# Patient Record
Sex: FEMALE | Race: WHITE | Employment: UNEMPLOYED | URBAN - METROPOLITAN AREA
[De-identification: names, ages, dates, MRNs, and addresses within clinical notes are randomized per-mention and may not be internally consistent; named-entity substitution may affect disease eponyms.]

---

## 2021-01-27 ENCOUNTER — HOSPITAL ENCOUNTER (OUTPATIENT)
Age: 31
Discharge: HOME | End: 2021-01-27
Payer: MEDICAID

## 2021-01-27 VITALS
RESPIRATION RATE: 18 BRPM | DIASTOLIC BLOOD PRESSURE: 49 MMHG | HEART RATE: 77 BPM | OXYGEN SATURATION: 100 % | SYSTOLIC BLOOD PRESSURE: 116 MMHG | TEMPERATURE: 98 F

## 2021-01-27 DIAGNOSIS — Z20.822 ENCOUNTER FOR LABORATORY TESTING FOR COVID-19 VIRUS: Primary | ICD-10-CM

## 2021-01-27 LAB — SARS-COV-2 RNA RESP QL NAA+PROBE: NOT DETECTED

## 2021-01-27 PROCEDURE — 99202 OFFICE O/P NEW SF 15 MIN: CPT

## 2021-01-27 PROCEDURE — 99212 OFFICE O/P EST SF 10 MIN: CPT

## 2021-01-28 NOTE — ED PROVIDER NOTES
Patient Seen in: [facility] [place]      History   Patient presents with:  Testing    Stated Complaint: COVID test,no exposure, symptoms fever loss of taste    HPI/Subjective:   HPI    21-year-old female here for Covid testing.   Patient reports subje General: Abdomen is flat. Musculoskeletal: Normal range of motion. Skin:     General: Skin is warm. Neurological:      General: No focal deficit present. Mental Status: She is alert and oriented to person, place, and time.    Psychiatric:

## 2021-01-28 NOTE — ED INITIAL ASSESSMENT (HPI)
Here for testing, denies sick contact with covid, states loss of sense of smell and taste for 2 days, pt is 24 weeks pregnant with prenatal care

## 2023-11-16 ENCOUNTER — APPOINTMENT (OUTPATIENT)
Dept: CT IMAGING | Age: 33
End: 2023-11-16
Attending: EMERGENCY MEDICINE

## 2023-11-16 ENCOUNTER — HOSPITAL ENCOUNTER (EMERGENCY)
Age: 33
Discharge: HOME | End: 2023-11-16
Attending: EMERGENCY MEDICINE

## 2023-11-16 VITALS
HEART RATE: 74 BPM | RESPIRATION RATE: 18 BRPM | BODY MASS INDEX: 21.45 KG/M2 | SYSTOLIC BLOOD PRESSURE: 112 MMHG | TEMPERATURE: 97.6 F | OXYGEN SATURATION: 99 % | WEIGHT: 125.66 LBS | HEIGHT: 64 IN | DIASTOLIC BLOOD PRESSURE: 64 MMHG

## 2023-11-16 DIAGNOSIS — M51.26 LUMBAR HERNIATED DISC: ICD-10-CM

## 2023-11-16 DIAGNOSIS — M54.42 ACUTE LEFT-SIDED LOW BACK PAIN WITH LEFT-SIDED SCIATICA: Primary | ICD-10-CM

## 2023-11-16 LAB
ANION GAP SERPL CALC-SCNC: 8 MMOL/L (ref 7–19)
BASOPHILS # BLD: 0 K/MCL (ref 0–0.3)
BASOPHILS NFR BLD: 0 %
BUN SERPL-MCNC: 16 MG/DL (ref 6–20)
BUN/CREAT SERPL: 23 (ref 7–25)
CALCIUM SERPL-MCNC: 9 MG/DL (ref 8.4–10.2)
CHLORIDE SERPL-SCNC: 109 MMOL/L (ref 97–110)
CO2 SERPL-SCNC: 25 MMOL/L (ref 21–32)
CREAT SERPL-MCNC: 0.7 MG/DL (ref 0.51–0.95)
DEPRECATED RDW RBC: 45.7 FL (ref 39–50)
EGFRCR SERPLBLD CKD-EPI 2021: >90 ML/MIN/{1.73_M2}
EOSINOPHIL # BLD: 0.1 K/MCL (ref 0–0.5)
EOSINOPHIL NFR BLD: 2 %
ERYTHROCYTE [DISTWIDTH] IN BLOOD: 12.8 % (ref 11–15)
FASTING DURATION TIME PATIENT: NORMAL H
GLUCOSE SERPL-MCNC: 96 MG/DL (ref 70–99)
HCG SERPL QL: NEGATIVE
HCT VFR BLD CALC: 41.7 % (ref 36–46.5)
HGB BLD-MCNC: 13.8 G/DL (ref 12–15.5)
IMM GRANULOCYTES # BLD AUTO: 0 K/MCL (ref 0–0.2)
IMM GRANULOCYTES # BLD: 0 %
LYMPHOCYTES # BLD: 1.9 K/MCL (ref 1–4.8)
LYMPHOCYTES NFR BLD: 32 %
MCH RBC QN AUTO: 31.9 PG (ref 26–34)
MCHC RBC AUTO-ENTMCNC: 33.1 G/DL (ref 32–36.5)
MCV RBC AUTO: 96.5 FL (ref 78–100)
MONOCYTES # BLD: 0.4 K/MCL (ref 0.3–0.9)
MONOCYTES NFR BLD: 7 %
NEUTROPHILS # BLD: 3.4 K/MCL (ref 1.8–7.7)
NEUTROPHILS NFR BLD: 59 %
NRBC BLD MANUAL-RTO: 0 /100 WBC
PLATELET # BLD AUTO: 223 K/MCL (ref 140–450)
POTASSIUM SERPL-SCNC: 4.1 MMOL/L (ref 3.4–5.1)
RAINBOW EXTRA TUBES HOLD SPECIMEN: NORMAL
RBC # BLD: 4.32 MIL/MCL (ref 4–5.2)
SODIUM SERPL-SCNC: 138 MMOL/L (ref 135–145)
WBC # BLD: 5.9 K/MCL (ref 4.2–11)

## 2023-11-16 PROCEDURE — 96374 THER/PROPH/DIAG INJ IV PUSH: CPT

## 2023-11-16 PROCEDURE — 74174 CTA ABD&PLVS W/CONTRAST: CPT

## 2023-11-16 PROCEDURE — 85025 COMPLETE CBC W/AUTO DIFF WBC: CPT | Performed by: EMERGENCY MEDICINE

## 2023-11-16 PROCEDURE — 96375 TX/PRO/DX INJ NEW DRUG ADDON: CPT

## 2023-11-16 PROCEDURE — 10005281 CT LUMBAR SPINE 2D REFORMATTED

## 2023-11-16 PROCEDURE — 10002805 HB CONTRAST AGENT: Performed by: EMERGENCY MEDICINE

## 2023-11-16 PROCEDURE — 99283 EMERGENCY DEPT VISIT LOW MDM: CPT

## 2023-11-16 PROCEDURE — 10002800 HB RX 250 W HCPCS: Performed by: EMERGENCY MEDICINE

## 2023-11-16 PROCEDURE — 84703 CHORIONIC GONADOTROPIN ASSAY: CPT | Performed by: EMERGENCY MEDICINE

## 2023-11-16 PROCEDURE — 80048 BASIC METABOLIC PNL TOTAL CA: CPT | Performed by: EMERGENCY MEDICINE

## 2023-11-16 RX ORDER — KETOROLAC TROMETHAMINE 15 MG/ML
15 INJECTION, SOLUTION INTRAMUSCULAR; INTRAVENOUS ONCE
Status: COMPLETED | OUTPATIENT
Start: 2023-11-16 | End: 2023-11-16

## 2023-11-16 RX ORDER — HYDROCODONE BITARTRATE AND ACETAMINOPHEN 5; 325 MG/1; MG/1
1-2 TABLET ORAL EVERY 6 HOURS PRN
Qty: 12 TABLET | Refills: 0 | Status: SHIPPED | OUTPATIENT
Start: 2023-11-16 | End: 2023-11-19

## 2023-11-16 RX ORDER — ONDANSETRON 2 MG/ML
4 INJECTION INTRAMUSCULAR; INTRAVENOUS ONCE
Status: COMPLETED | OUTPATIENT
Start: 2023-11-16 | End: 2023-11-16

## 2023-11-16 RX ORDER — IBUPROFEN 600 MG/1
600 TABLET ORAL EVERY 6 HOURS
Qty: 40 TABLET | Refills: 0 | Status: SHIPPED | OUTPATIENT
Start: 2023-11-16 | End: 2023-11-26

## 2023-11-16 RX ORDER — METHYLPREDNISOLONE 4 MG/1
4 TABLET ORAL SEE ADMIN INSTRUCTIONS
Qty: 21 TABLET | Refills: 0 | Status: SHIPPED | OUTPATIENT
Start: 2023-11-16

## 2023-11-16 RX ADMIN — IOHEXOL 80 ML: 350 INJECTION, SOLUTION INTRAVENOUS at 11:56

## 2023-11-16 RX ADMIN — ONDANSETRON 4 MG: 2 INJECTION INTRAMUSCULAR; INTRAVENOUS at 11:05

## 2023-11-16 RX ADMIN — MORPHINE SULFATE 4 MG: 4 INJECTION INTRAVENOUS at 11:07

## 2023-11-16 RX ADMIN — KETOROLAC TROMETHAMINE 15 MG: 15 INJECTION, SOLUTION INTRAMUSCULAR; INTRAVENOUS at 12:34

## 2023-11-16 ASSESSMENT — ENCOUNTER SYMPTOMS
ABDOMINAL PAIN: 0
FEVER: 0
BACK PAIN: 1
VOMITING: 0
DIARRHEA: 0
NAUSEA: 0
SHORTNESS OF BREATH: 0
DIZZINESS: 0
CHILLS: 0
LIGHT-HEADEDNESS: 0

## 2023-11-16 ASSESSMENT — PAIN SCALES - GENERAL: PAINLEVEL_OUTOF10: 8

## 2024-01-02 ENCOUNTER — TELEPHONE (OUTPATIENT)
Dept: EMERGENCY MEDICINE | Age: 34
End: 2024-01-02

## 2024-03-01 ENCOUNTER — HOSPITAL ENCOUNTER (OUTPATIENT)
Age: 34
Discharge: HOME | End: 2024-03-01
Payer: MEDICAID

## 2024-03-01 VITALS
RESPIRATION RATE: 16 BRPM | DIASTOLIC BLOOD PRESSURE: 60 MMHG | SYSTOLIC BLOOD PRESSURE: 120 MMHG | OXYGEN SATURATION: 100 % | HEART RATE: 72 BPM | TEMPERATURE: 98 F

## 2024-03-01 DIAGNOSIS — N39.0 URINARY TRACT INFECTION WITHOUT HEMATURIA, SITE UNSPECIFIED: Primary | ICD-10-CM

## 2024-03-01 DIAGNOSIS — N89.8 VAGINAL DISCHARGE: ICD-10-CM

## 2024-03-01 DIAGNOSIS — M54.50 BILATERAL LOW BACK PAIN WITHOUT SCIATICA, UNSPECIFIED CHRONICITY: ICD-10-CM

## 2024-03-01 LAB
B-HCG UR QL: NEGATIVE
BILIRUB UR QL STRIP: NEGATIVE
CLARITY UR: CLEAR
COLOR UR: YELLOW
GLUCOSE UR STRIP-MCNC: NEGATIVE MG/DL
HGB UR QL STRIP: NEGATIVE
KETONES UR STRIP-MCNC: NEGATIVE MG/DL
LEUKOCYTE ESTERASE UR QL STRIP: NEGATIVE
NITRITE UR QL STRIP: POSITIVE
PH UR STRIP: 6 [PH]
PROT UR STRIP-MCNC: NEGATIVE MG/DL
SP GR UR STRIP: 1.02
UROBILINOGEN UR STRIP-ACNC: <2 MG/DL

## 2024-03-01 PROCEDURE — 87186 SC STD MICRODIL/AGAR DIL: CPT | Performed by: NURSE PRACTITIONER

## 2024-03-01 PROCEDURE — 81025 URINE PREGNANCY TEST: CPT

## 2024-03-01 PROCEDURE — 87086 URINE CULTURE/COLONY COUNT: CPT | Performed by: NURSE PRACTITIONER

## 2024-03-01 PROCEDURE — 87077 CULTURE AEROBIC IDENTIFY: CPT | Performed by: NURSE PRACTITIONER

## 2024-03-01 PROCEDURE — 99204 OFFICE O/P NEW MOD 45 MIN: CPT

## 2024-03-01 PROCEDURE — 81002 URINALYSIS NONAUTO W/O SCOPE: CPT

## 2024-03-01 PROCEDURE — 81514 NFCT DS BV&VAGINITIS DNA ALG: CPT | Performed by: NURSE PRACTITIONER

## 2024-03-01 RX ORDER — CYCLOBENZAPRINE HCL 10 MG
10 TABLET ORAL 2 TIMES DAILY PRN
Qty: 10 TABLET | Refills: 0 | Status: SHIPPED | OUTPATIENT
Start: 2024-03-01

## 2024-03-01 RX ORDER — NITROFURANTOIN 25; 75 MG/1; MG/1
100 CAPSULE ORAL 2 TIMES DAILY
Qty: 10 CAPSULE | Refills: 0 | Status: SHIPPED | OUTPATIENT
Start: 2024-03-01 | End: 2024-03-06

## 2024-03-01 NOTE — DISCHARGE INSTRUCTIONS
Merrni ibuprofen ose Tylenol pa recetë për dhimbje. Telefononi për të caktuar një takim me gjinekologun, merrni takimin e parë të disponueshëm. Thirrni për të caktuar një takim me fiziatrin, ky është një specialist i shpinës, duke marrë takimin e parë të disponueshëm. Filloni antibiotikun siç përshkruhet për infeksionin e urinës dhe merrni relaksuesin e muskujve siç përshkruhet do të shkaktojë përgjumje. Do të merrni një telefonatë me rezultatet e kulturës së urinës dhe do t'ju përshkruhet rezultatet e shtupës vaginale nëse nevojitet trajtim shtesë. Nëse osorio dhimbje të forta barku që përkeqësohen me dhimbje shpine, zgjohuni nuk mund të ndjeni se ekstremitetet tuaja të poshtme urinojnë pa dashje ose osorio një lëvizje të zorrëve mbi veten tuaj, zhvilloni ethe ose nauze ose të vjella, shkoni në departamentin më të afërt të urgjencës.

## 2024-03-01 NOTE — ED INITIAL ASSESSMENT (HPI)
Patient arrives ambulatory with c/o back pain x 5 months, worse in last 7-8 days. Reports hx sciatica. Also reporting pain with urination.

## 2024-03-01 NOTE — ED PROVIDER NOTES
Patient Seen in: [facility] [place]      History     Chief Complaint   Patient presents with    Back Pain           Urinary Symptoms     Stated Complaint: back pain;siatica pain    Subjective:   HPI    This is a 34-year-old Venezuelan speaking female presenting with back pain for 5 months and pain with urination and vaginal discharge.  Via language line solution Venezuelan  patient states that she has been dealing with off-and-on back pain for about 5 months but worse in the last 7 to 8 days.  Denies any fall injury or trauma.  Denies any heavy lifting pushing or pulling.  Denies any bowel or bladder incontinence or saddle paresthesia.  Patient states she also recently had a waxing done of the vaginal area and noticed some discharge and it felt like a bump or growth in the area and would like a Pap smear.  Patient states she is also having pain with urination.  Denies any concern for an STI she is  and sexually active with her spouse that she has been with for years.  Denies any nausea or vomiting or fever or abdominal pain.  Patient states that her menstrual cycle has been irregular sometimes lasting 10 days.   LMP 2/15/2024    Objective:   History reviewed. No pertinent past medical history.           History reviewed. No pertinent surgical history.             Social History     Socioeconomic History    Marital status:    Tobacco Use    Smoking status: Never    Smokeless tobacco: Never   Vaping Use    Vaping Use: Never used   Substance and Sexual Activity    Alcohol use: Not Currently    Drug use: Never              Review of Systems    Positive for stated complaint: back pain;siatica pain  Other systems are as noted in HPI.  Constitutional and vital signs reviewed.      All other systems reviewed and negative except as noted above.    Physical Exam     ED Triage Vitals [03/01/24 1244]   /60   Pulse 72   Resp 16   Temp 98.2 °F (36.8 °C)   Temp src Temporal   SpO2 100 %   O2 Device  None (Room air)       Current:/60   Pulse 72   Temp 98.2 °F (36.8 °C) (Temporal)   Resp 16   LMP 02/15/2024   SpO2 100%         Physical Exam  Vitals and nursing note reviewed.   Constitutional:       Appearance: Normal appearance.   HENT:      Right Ear: External ear normal.      Left Ear: External ear normal.      Nose: Nose normal.      Mouth/Throat:      Mouth: Mucous membranes are moist.      Pharynx: Oropharynx is clear.   Eyes:      Conjunctiva/sclera: Conjunctivae normal.   Abdominal:      Palpations: Abdomen is soft.      Tenderness: There is no abdominal tenderness. There is no right CVA tenderness or left CVA tenderness.   Genitourinary:     Comments: External exam no ulcers but skin tag noted on the left labia  Internal exam thick white vaginal discharge no CMT adnexal or suprapubic TTP  Musculoskeletal:         General: Normal range of motion.      Cervical back: Normal range of motion.      Comments: Cervical thoracic lumbar spine no midline TTP or step-offs   Skin:     General: Skin is warm.   Neurological:      Mental Status: She is alert and oriented to person, place, and time.               ED Course     Labs Reviewed   [facility] POCT URINALYSIS DIPSTICK - Abnormal; Notable for the following components:       Result Value    Nitrite Urine Positive (*)     All other components within normal limits   POCT PREGNANCY URINE - Normal   VAGINITIS VAGINOSIS PCR PANEL   URINE CULTURE, ROUTINE                      MDM          Medical Decision Making  34-year-old female well-appearing and nontoxic with back pain for 5 months vaginal discharge and urinary symptoms.  Concern for dysuria versus UTI versus interstitial cystitis versus STI versus BV versus yeast infection versus acute on chronic back pain versus lumbar strain with sciatica versus nephrolithiasis.  Given no abdominal tenderness and no CVA tenderness no clinical indication for labs or imaging but a urine dip UCG and vaginitis swab will be  collected patient declined STI testing as she has no concerns for that at this time but she is agreeable with the rest of the plan of care this was discussed with the patient via language line solution Grenadian .      Discussed with the patient her urine has positive nitrites concerning for possible urinary infection via language line solutions [place] .  Discussed treatment with Macrobid.  Discussed that the back pain is likely muscular skeletal and reproducible will prescribe Flexeril and discussed side effect of drowsiness no driving or operating machinery.  Discussed over-the-counter ibuprofen and Tylenol for pain.  Discussed following up with physiatry and gynecology resources will be provided.  Discussed the results come back positive that need additional treatment she will be notified and medication will be prescribed.  Discussed strict ER precautions with any new or worsening symptoms.  Patient feels comfortable with the plan of care and acknowledges understanding discharge instructions.    Problems Addressed:  Urinary tract infection without hematuria, site unspecified: acute illness or injury  Vaginal discharge: acute illness or injury    Amount and/or Complexity of Data Reviewed  Labs:  Decision-making details documented in ED Course.    Risk  OTC drugs.  Prescription drug management.        Disposition and Plan     Clinical Impression:  1. Urinary tract infection without hematuria, site unspecified    2. Vaginal discharge    3. Bilateral low back pain without sciatica, unspecified chronicity         Disposition:  Discharge  3/1/2024  1:16 pm    Follow-up:  Kusum Lee MD  [address] [facility] [address]  [place] [id number] [phone]    Call   Burim për Isma Guardado DO  [address] [facility] [address]  [place] [id number] [phone]    Call   Burim për joe salinas specialist të jaleesa për të vaisiahhduareilly pritchettresthern për claudette caktuar njtello takim          Medications  Prescribed:  Discharge Medication List as of 3/1/2024  1:16 PM        START taking these medications    Details   nitrofurantoin monohydrate macro 100 MG Oral Cap Take 1 capsule (100 mg total) by mouth 2 (two) times daily for 5 days., Print, Disp-10 capsule, R-0      cyclobenzaprine 10 MG Oral Tab Take 1 tablet (10 mg total) by mouth 2 (two) times daily as needed for Muscle spasms (May cause drowsiness no driving or operating machinery while taking this medication)., Print, Disp-10 tablet, R-0

## 2024-03-02 LAB
BV BACTERIA DNA VAG QL NAA+PROBE: NEGATIVE
C GLABRATA DNA VAG QL NAA+PROBE: NEGATIVE
C KRUSEI DNA VAG QL NAA+PROBE: NEGATIVE
CANDIDA DNA VAG QL NAA+PROBE: NEGATIVE
T VAGINALIS DNA VAG QL NAA+PROBE: NEGATIVE

## 2024-08-08 ENCOUNTER — HOSPITAL ENCOUNTER (OUTPATIENT)
Age: 34
Discharge: HOME | End: 2024-08-08

## 2024-08-08 VITALS
DIASTOLIC BLOOD PRESSURE: 68 MMHG | RESPIRATION RATE: 16 BRPM | HEART RATE: 92 BPM | SYSTOLIC BLOOD PRESSURE: 109 MMHG | OXYGEN SATURATION: 100 % | TEMPERATURE: 98 F

## 2024-08-08 DIAGNOSIS — N89.8 VAGINAL DISCHARGE: Primary | ICD-10-CM

## 2024-08-08 DIAGNOSIS — N34.2 URETHRITIS: ICD-10-CM

## 2024-08-08 LAB
B-HCG UR QL: NEGATIVE
GLUCOSE UR STRIP-MCNC: NEGATIVE MG/DL
HGB UR QL STRIP: NEGATIVE
KETONES UR STRIP-MCNC: NEGATIVE MG/DL
NITRITE UR QL STRIP: NEGATIVE
PH UR STRIP: 5.5 [PH]
PROT UR STRIP-MCNC: 30 MG/DL
SP GR UR STRIP: >=1.03
UROBILINOGEN UR STRIP-ACNC: <2 MG/DL

## 2024-08-08 PROCEDURE — 81514 NFCT DS BV&VAGINITIS DNA ALG: CPT | Performed by: PHYSICIAN ASSISTANT

## 2024-08-08 PROCEDURE — 81025 URINE PREGNANCY TEST: CPT

## 2024-08-08 PROCEDURE — 99214 OFFICE O/P EST MOD 30 MIN: CPT

## 2024-08-08 PROCEDURE — 87186 SC STD MICRODIL/AGAR DIL: CPT | Performed by: PHYSICIAN ASSISTANT

## 2024-08-08 PROCEDURE — 81002 URINALYSIS NONAUTO W/O SCOPE: CPT

## 2024-08-08 PROCEDURE — 87088 URINE BACTERIA CULTURE: CPT | Performed by: PHYSICIAN ASSISTANT

## 2024-08-08 PROCEDURE — 87086 URINE CULTURE/COLONY COUNT: CPT | Performed by: PHYSICIAN ASSISTANT

## 2024-08-08 RX ORDER — CEPHALEXIN 500 MG/1
500 CAPSULE ORAL 3 TIMES DAILY
Qty: 21 CAPSULE | Refills: 0 | Status: SHIPPED | OUTPATIENT
Start: 2024-08-08 | End: 2024-08-15

## 2024-08-08 RX ORDER — PHENAZOPYRIDINE HYDROCHLORIDE 200 MG/1
200 TABLET, FILM COATED ORAL 3 TIMES DAILY PRN
Qty: 6 TABLET | Refills: 0 | Status: SHIPPED | OUTPATIENT
Start: 2024-08-08 | End: 2024-08-10

## 2024-08-08 NOTE — ED INITIAL ASSESSMENT (HPI)
Urinary symptoms with pelvic cramps today, no fever, no flank or back pain, no vaginal discharge or bleeding

## 2024-08-08 NOTE — ED PROVIDER NOTES
Chief Complaint   Patient presents with    Urinary Symptoms       HPI:     Sadi Young is a 34 year old female who presents for evaluation of dysuria and lower suprapubic pain overnight.  Took Advil this morning with mild improvement, pain at rest, 3 out of 10, during voiding, 7 out of 10.  Notes history of UTI treated outpatient in March with similar symptoms.  Notes mild white discharge vaginally over the last days.  Denies recent history of BV trichomoniasis or any STI concerns.  Denies associated fevers chills headache dizziness neck pain chest pain shortness of breath flank pain vomiting diarrhea hematuria vaginal bleeding or rash.      PFSH    PFSH asessment screens reviewed and agree.  Nurses notes reviewed I agree with documentation.    No family history on file.  Family history reviewed with patient/caregiver and is not pertinent to presenting problem.  Social History     Socioeconomic History    Marital status:      Spouse name: Not on file    Number of children: Not on file    Years of education: Not on file    Highest education level: Not on file   Occupational History    Not on file   Tobacco Use    Smoking status: Never    Smokeless tobacco: Never   Vaping Use    Vaping status: Never Used   Substance and Sexual Activity    Alcohol use: Not Currently    Drug use: Never    Sexual activity: Not on file   Other Topics Concern    Not on file   Social History Narrative    Not on file     Social Determinants of Health     Financial Resource Strain: Not on file   Food Insecurity: Not on file   Transportation Needs: Not on file   Physical Activity: Not on file   Stress: Not on file   Social Connections: Not on file   Housing Stability: Low Risk  (3/4/2024)    Received from [facility]    Housing Stability     Mortgage Payment Concerns?: Not on file     Number of Places Lived in the Last Year: Not on file     Unstable Housing?: Not on file         ROS:   Positive for stated complaint:  Dysuria vaginal discharge  All other systems reviewed and negative except as noted above.  Constitutional and Vital Signs Reviewed.      Physical Exam:     Findings:    /68   Pulse 92   Temp 97.6 °F (36.4 °C) (Temporal)   Resp 16   LMP 02/15/2024   SpO2 100%   GENERAL: well developed, well nourished, well hydrated, no distress  SKIN: good skin turgor, no obvious rashes  EXTREMITIES: no cyanosis or edema. ANDERSON without difficulty  GI: Negative Tineo.  Negative [place].  Negative rebound.  No adnexal or CVA tenderness bilaterally.  Soft, non-tender, normal bowel sounds  HEAD: normocephalic, atraumatic  EYES: sclera non icteric bilateral, conjunctiva clear  NOSE: nasal turbinates: pink, normal mucosa  NEURO: No focal deficits  PSYCH: Alert and oriented x3.  Answering questions appropriately.  Mood appropriate.    MDM/Assessment/Plan:   Orders for this encounter:    Orders Placed This Encounter    POCT Urinalysis Dipstick    POCT Pregnancy, Urine    Urine Culture, Routine     Order Specific Question:   Specimen source:     Answer:   Urine, clean catch     Order Specific Question:   Documentation of symptoms of UTI or sepsis:     Answer:   Documentation of symptoms of UTI or sepsis must be corroborated within the EMR     Order Specific Question:   Release to patient     Answer:   Immediate    Vaginitis Vaginosis PCR Panel     Order Specific Question:   Release to patient     Answer:   Immediate    POCT Urine Dip    POCT Pregnancy, Urine    cephalexin 500 MG Oral Cap     Sig: Take 1 capsule (500 mg total) by mouth 3 (three) times daily for 7 days.     Dispense:  21 capsule     Refill:  0    phenazopyridine 200 MG Oral Tab     Sig: Take 1 tablet (200 mg total) by mouth 3 (three) times daily as needed for Pain.     Dispense:  6 tablet     Refill:  0       Labs performed this visit:  Recent Results (from the past 10 hour(s))   POCT Urinalysis Dipstick    Collection Time: 08/08/24 12:47 PM   Result Value Ref Range     Urine Color Dark yellow Yellow    Urine Clarity Slightly cloudy (A) Clear    Specific Gravity, Urine >=1.030 1.005 - 1.030    PH, Urine 5.5 5.0 - 8.0    Protein urine 30 (A) Negative mg/dL    Glucose, Urine Negative Negative mg/dL    Ketone, Urine Negative Negative mg/dL    Bilirubin, Urine Small (A) Negative    Blood, Urine Negative Negative    Nitrite Urine Negative Negative    Urobilinogen urine <2.0 <2.0 mg/dL    Leukocyte esterase urine Trace (A) Negative   POCT Pregnancy, Urine    Collection Time: 08/08/24 12:51 PM   Result Value Ref Range    POCT Urine Pregnancy Negative Negative       MDM:  Patient urinalysis not highly concerning of UTI with trace leukocytes and negative nitrates.  Patient previous culture shows Klebsiella growth with pan sensibility outside of ampicillin.  Will provide Macrobid pending further guidance and assessment based on culture results.,  Patient denies STI concerns requesting no GC evaluation, vaginosis screen sent without prophylactic treatment based on discussion.  Patient happy with plan of care will provide Pyridium for supportive agent with pigment change side effect as notified prior to disposition.  Patient states she is not breast-feeding.    Diagnosis:    ICD-10-CM    1. Vaginal discharge  N89.8       2. Urethritis  N34.2           All results reviewed and discussed with patient.  See AVS for detailed discharge instructions for your condition today.    Follow Up with:  Mino Edmond, DO  [address] [place] [id number] [phone]    Schedule an appointment as soon as possible for a visit in 3 days  READDRESS CULTURES/SYMPTOMS

## 2025-01-04 ENCOUNTER — HOSPITAL ENCOUNTER (OUTPATIENT)
Age: 35
Discharge: HOME | End: 2025-01-04
Payer: MEDICAID

## 2025-01-04 VITALS
RESPIRATION RATE: 18 BRPM | HEART RATE: 79 BPM | DIASTOLIC BLOOD PRESSURE: 63 MMHG | TEMPERATURE: 97 F | SYSTOLIC BLOOD PRESSURE: 106 MMHG | OXYGEN SATURATION: 98 %

## 2025-01-04 DIAGNOSIS — N30.90 CYSTITIS: Primary | ICD-10-CM

## 2025-01-04 DIAGNOSIS — M54.6 ACUTE RIGHT-SIDED THORACIC BACK PAIN: ICD-10-CM

## 2025-01-04 LAB
B-HCG UR QL: NEGATIVE
BILIRUB UR QL STRIP: NEGATIVE
CLARITY UR: CLEAR
COLOR UR: YELLOW
GLUCOSE UR STRIP-MCNC: NEGATIVE MG/DL
HGB UR QL STRIP: NEGATIVE
KETONES UR STRIP-MCNC: NEGATIVE MG/DL
LEUKOCYTE ESTERASE UR QL STRIP: NEGATIVE
NITRITE UR QL STRIP: NEGATIVE
PH UR STRIP: 6.5 [PH]
PROT UR STRIP-MCNC: NEGATIVE MG/DL
SP GR UR STRIP: 1.02
UROBILINOGEN UR STRIP-ACNC: <2 MG/DL

## 2025-01-04 PROCEDURE — 81002 URINALYSIS NONAUTO W/O SCOPE: CPT

## 2025-01-04 PROCEDURE — 81025 URINE PREGNANCY TEST: CPT

## 2025-01-04 PROCEDURE — 99214 OFFICE O/P EST MOD 30 MIN: CPT

## 2025-01-04 PROCEDURE — 87086 URINE CULTURE/COLONY COUNT: CPT | Performed by: PHYSICIAN ASSISTANT

## 2025-01-04 RX ORDER — CEPHALEXIN 500 MG/1
500 CAPSULE ORAL 2 TIMES DAILY
Qty: 14 CAPSULE | Refills: 0 | Status: SHIPPED | OUTPATIENT
Start: 2025-01-04 | End: 2025-01-11

## 2025-01-04 NOTE — ED PROVIDER NOTES
Patient Seen in: [facility] [place]      History     Chief Complaint   Patient presents with    Back Pain    Urinary Symptoms     Stated Complaint: Back pain, urinary problem    Subjective:   HPI      Patient is a 34-year-old female, presenting to immediate care for concern for urinary tract infection.  She has been experiencing urinary urgency and discomfort with urination for the last 5 days.  Now having associated right-sided back pain with associated subjective fevers, chills, and generalized feeling unwell.  No fever in clinic.  No nausea or vomiting.  No gross hematuria.  No vaginal bleeding.  No concern for STIs.  Prior history of UTI.  No prior history of kidney infection/kidney stones.  Not immunocompromise    Objective:     History reviewed. No pertinent past medical history.           History reviewed. No pertinent surgical history.             Social History     Socioeconomic History    Marital status:    Tobacco Use    Smoking status: Never    Smokeless tobacco: Never   Vaping Use    Vaping status: Never Used   Substance and Sexual Activity    Alcohol use: Not Currently    Drug use: Never     Social Drivers of Health      Received from [facility]    Housing Stability              Review of Systems   Constitutional:  Positive for chills and fever.   Genitourinary:  Positive for dysuria and frequency. Negative for hematuria, pelvic pain and vaginal bleeding.   Musculoskeletal:  Positive for back pain.   Allergic/Immunologic: Negative for immunocompromised state.   Neurological:  Negative for dizziness, weakness, light-headedness and headaches.       Positive for stated complaint: Back pain, urinary problem  Other systems are as noted in HPI.  Constitutional and vital signs reviewed.      All other systems reviewed and negative except as noted above.    Physical Exam     ED Triage Vitals [01/04/25 1341]   /63   Pulse 79   Resp 18   Temp 97.4 °F (36.3 °C)   Temp src  Oral   SpO2 98 %   O2 Device None (Room air)       Current Vitals:   Vital Signs  BP: 106/63  Pulse: 79  Resp: 18  Temp: 97.4 °F (36.3 °C)  Temp src: Oral    Oxygen Therapy  SpO2: 98 %  O2 Device: None (Room air)        Physical Exam  Vitals and nursing note reviewed.   Constitutional:       General: She is not in acute distress.     Appearance: Normal appearance. She is not ill-appearing, toxic-appearing or diaphoretic.   HENT:      Head: Normocephalic and atraumatic.      Mouth/Throat:      Mouth: Mucous membranes are moist.   Eyes:      Conjunctiva/sclera: Conjunctivae normal.   Cardiovascular:      Rate and Rhythm: Normal rate.   Pulmonary:      Effort: No respiratory distress.   Abdominal:      General: There is no distension.      Palpations: Abdomen is soft.      Tenderness: There is no abdominal tenderness.   Musculoskeletal:         General: No swelling or tenderness. Normal range of motion.      Cervical back: Normal range of motion. No rigidity.   Neurological:      Mental Status: She is alert.      Motor: No weakness.      Coordination: Coordination normal.      Gait: Gait normal.   Psychiatric:         Mood and Affect: Mood normal.         Behavior: Behavior normal.           ED Course     Labs Reviewed   POCT PREGNANCY URINE - Normal   [facility] POCT URINALYSIS DIPSTICK   URINE CULTURE, ROUTINE       Results for orders placed or performed during the hospital encounter of 01/04/25   POCT Urinalysis Dipstick    Collection Time: 01/04/25  2:01 PM   Result Value Ref Range    Urine Color Yellow Yellow    Urine Clarity Clear Clear    Specific Gravity, Urine 1.020 1.005 - 1.030    PH, Urine 6.5 5.0 - 8.0    Protein urine Negative Negative mg/dL    Glucose, Urine Negative Negative mg/dL    Ketone, Urine Negative Negative mg/dL    Bilirubin, Urine Negative Negative    Blood, Urine Negative Negative    Nitrite Urine Negative Negative    Urobilinogen urine <2.0 <2.0 mg/dL    Leukocyte esterase urine Negative Negative    POCT Pregnancy, Urine    Collection Time: 01/04/25  2:02 PM   Result Value Ref Range    POCT Urine Pregnancy Negative Negative        MDM     Differential diagnoses considered included, but are not exclusive of: cystitis, overactive bladder syndrome, acute urinary tract infection, pyelonephritis, back strain, etc.    Patient is a 34-year-old female, presenting to immediate care for urinary tract infection.  She has been experiencing urinary urgency and dysuria for the last 5 days.  Now having associated subjective fever, chills and right-sided thoracic back pain.  Patient is well-appearing, helically stable.  Patient with benign exam.  Urine dip unremarkable.  Urine negative for nitrate and leukocyte esterase.  Urine negative for hematuria, proteinuria, glucosuria, or ketonuria.  Urine pregnancy negative.  Will send for urine culture/pending. Rx Keflex BID for 7 days for UTI.  Instructed to take if have worsening symptoms or urine culture positive.    Medical Decision Making      Disposition and Plan     Clinical Impression:  1. Cystitis    2. Acute right-sided thoracic back pain         Disposition:  Discharge  1/4/2025  2:23 pm    Follow-up:  Mino Edmond, DO  [address] [place] [address]  [phone]                Medications Prescribed:  Current Discharge Medication List        START taking these medications    Details   cephALEXin 500 MG Oral Cap Take 1 capsule (500 mg total) by mouth 2 (two) times daily for 7 days.  Qty: 14 capsule, Refills: 0                 Supplementary Documentation:

## 2025-05-30 ENCOUNTER — HOSPITAL ENCOUNTER (EMERGENCY)
Age: 35
Discharge: HOME | End: 2025-05-31
Attending: STUDENT IN AN ORGANIZED HEALTH CARE EDUCATION/TRAINING PROGRAM

## 2025-05-30 VITALS
HEART RATE: 68 BPM | RESPIRATION RATE: 16 BRPM | TEMPERATURE: 98.6 F | SYSTOLIC BLOOD PRESSURE: 96 MMHG | DIASTOLIC BLOOD PRESSURE: 57 MMHG | OXYGEN SATURATION: 97 %

## 2025-05-30 DIAGNOSIS — K08.89 PAIN, DENTAL: Primary | ICD-10-CM

## 2025-05-30 PROCEDURE — 10002803 HB RX 637: Performed by: STUDENT IN AN ORGANIZED HEALTH CARE EDUCATION/TRAINING PROGRAM

## 2025-05-30 PROCEDURE — 99284 EMERGENCY DEPT VISIT MOD MDM: CPT | Performed by: STUDENT IN AN ORGANIZED HEALTH CARE EDUCATION/TRAINING PROGRAM

## 2025-05-30 RX ORDER — HYDROCODONE BITARTRATE AND ACETAMINOPHEN 5; 325 MG/1; MG/1
1 TABLET ORAL ONCE
Refills: 0 | Status: COMPLETED | OUTPATIENT
Start: 2025-05-31 | End: 2025-05-30

## 2025-05-30 RX ORDER — HYDROCODONE BITARTRATE AND ACETAMINOPHEN 5; 325 MG/1; MG/1
1 TABLET ORAL EVERY 6 HOURS PRN
Qty: 5 TABLET | Refills: 0 | Status: SHIPPED | OUTPATIENT
Start: 2025-05-30

## 2025-05-30 RX ADMIN — HYDROCODONE BITARTRATE AND ACETAMINOPHEN 1 TABLET: 5; 325 TABLET ORAL at 23:59

## 2025-05-30 SDOH — SOCIAL STABILITY: SOCIAL INSECURITY: HOW OFTEN DOES ANYONE, INCLUDING FAMILY AND FRIENDS, INSULT OR TALK DOWN TO YOU?: NEVER

## 2025-05-30 SDOH — SOCIAL STABILITY: SOCIAL INSECURITY: HOW OFTEN DOES ANYONE, INCLUDING FAMILY AND FRIENDS, THREATEN YOU WITH HARM?: NEVER

## 2025-05-30 SDOH — SOCIAL STABILITY: SOCIAL INSECURITY: HOW OFTEN DOES ANYONE, INCLUDING FAMILY AND FRIENDS, PHYSICALLY HURT YOU?: NEVER

## 2025-05-30 SDOH — SOCIAL STABILITY: SOCIAL INSECURITY: HOW OFTEN DOES ANYONE, INCLUDING FAMILY AND FRIENDS, SCREAM OR CURSE AT YOU?: NEVER

## 2025-05-30 ASSESSMENT — PAIN SCALES - GENERAL: PAINLEVEL_OUTOF10: 9

## 2025-06-26 ENCOUNTER — APPOINTMENT (OUTPATIENT)
Dept: GENERAL RADIOLOGY | Age: 35
End: 2025-06-26
Attending: EMERGENCY MEDICINE
Payer: MEDICAID

## 2025-06-26 ENCOUNTER — HOSPITAL ENCOUNTER (OUTPATIENT)
Age: 35
Discharge: HOME | End: 2025-06-26
Attending: EMERGENCY MEDICINE
Payer: MEDICAID

## 2025-06-26 VITALS
TEMPERATURE: 99 F | DIASTOLIC BLOOD PRESSURE: 51 MMHG | RESPIRATION RATE: 19 BRPM | SYSTOLIC BLOOD PRESSURE: 100 MMHG | OXYGEN SATURATION: 98 % | HEART RATE: 84 BPM

## 2025-06-26 DIAGNOSIS — M54.9 MILD BACK PAIN: Primary | ICD-10-CM

## 2025-06-26 DIAGNOSIS — R51.9 ACUTE NONINTRACTABLE HEADACHE, UNSPECIFIED HEADACHE TYPE: ICD-10-CM

## 2025-06-26 LAB
#MXD IC: 0.5 X10ˆ3/UL (ref 0.1–1)
B-HCG UR QL: NEGATIVE
BILIRUB UR QL STRIP: NEGATIVE
BUN BLD-MCNC: 11 MG/DL (ref 7–18)
CHLORIDE BLD-SCNC: 105 MMOL/L (ref 98–112)
CLARITY UR: CLEAR
CO2 BLD-SCNC: 23 MMOL/L (ref 21–32)
CREAT BLD-MCNC: 0.6 MG/DL (ref 0.55–1.02)
EGFRCR SERPLBLD CKD-EPI 2021: 119 ML/MIN/1.73M2 (ref 60–?)
GLUCOSE BLD-MCNC: 90 MG/DL (ref 70–99)
GLUCOSE UR STRIP-MCNC: NEGATIVE MG/DL
HCT VFR BLD AUTO: 41.6 % (ref 35–48)
HCT VFR BLD CALC: 44 % (ref 34–50)
HGB BLD-MCNC: 13.6 G/DL (ref 12–16)
HGB UR QL STRIP: NEGATIVE
ISTAT IONIZED CALCIUM FOR CHEM 8: 1.19 MMOL/L (ref 1.12–1.32)
KETONES UR STRIP-MCNC: NEGATIVE MG/DL
LYMPHOCYTES # BLD AUTO: 2.4 X10ˆ3/UL (ref 1–4)
LYMPHOCYTES NFR BLD AUTO: 38.9 %
MCH RBC QN AUTO: 31.6 PG (ref 26–34)
MCHC RBC AUTO-ENTMCNC: 32.7 G/DL (ref 31–37)
MCV RBC AUTO: 96.7 FL (ref 80–100)
MIXED CELL %: 7.8 %
NEUTROPHILS # BLD AUTO: 3.3 X10ˆ3/UL (ref 1.5–7.7)
NEUTROPHILS NFR BLD AUTO: 53.3 %
NITRITE UR QL STRIP: NEGATIVE
PH UR STRIP: 6 [PH]
PLATELET # BLD AUTO: 236 X10ˆ3/UL (ref 150–450)
POTASSIUM BLD-SCNC: 4 MMOL/L (ref 3.6–5.1)
PROT UR STRIP-MCNC: NEGATIVE MG/DL
RBC # BLD AUTO: 4.3 X10ˆ6/UL (ref 3.8–5.3)
SODIUM BLD-SCNC: 140 MMOL/L (ref 136–145)
SP GR UR STRIP: 1.02
UROBILINOGEN UR STRIP-ACNC: <2 MG/DL
WBC # BLD AUTO: 6.2 X10ˆ3/UL (ref 4–11)

## 2025-06-26 PROCEDURE — 72110 X-RAY EXAM L-2 SPINE 4/>VWS: CPT | Performed by: EMERGENCY MEDICINE

## 2025-06-26 PROCEDURE — 36415 COLL VENOUS BLD VENIPUNCTURE: CPT

## 2025-06-26 PROCEDURE — 81025 URINE PREGNANCY TEST: CPT

## 2025-06-26 PROCEDURE — 80047 BASIC METABLC PNL IONIZED CA: CPT

## 2025-06-26 PROCEDURE — 85025 COMPLETE CBC W/AUTO DIFF WBC: CPT | Performed by: EMERGENCY MEDICINE

## 2025-06-26 PROCEDURE — 99215 OFFICE O/P EST HI 40 MIN: CPT

## 2025-06-26 PROCEDURE — 81002 URINALYSIS NONAUTO W/O SCOPE: CPT

## 2025-06-26 PROCEDURE — 99214 OFFICE O/P EST MOD 30 MIN: CPT

## 2025-06-26 NOTE — ED PROVIDER NOTES
Patient Seen in: [facility] [place]      History     Chief Complaint   Patient presents with    Headache    Back Pain     Stated Complaint: back pain, headaches, rash/skin issue  Language barrier, computer  utilized  Subjective:     35-year-old female presents today for evaluation of frontal headache for the past 2 weeks, low back pain for the past 2 years and some white discoloration on her legs has been going on for months.  No fevers or chills.  No cough runny nose sore throat.  No vomiting or diarrhea.  Patient reports she has some numbness in her left lateral toes that is been going on for the past 2 years since she had sciatica.  No bowel or bladder incontinence.  No urinary complaints.  Patient is here because she wants to get her vitamin levels checked.    Objective:   History reviewed. No pertinent past medical history.           History reviewed. No pertinent surgical history.             Social History     Socioeconomic History    Marital status:    Tobacco Use    Smoking status: Never    Smokeless tobacco: Never   Vaping Use    Vaping status: Never Used   Substance and Sexual Activity    Alcohol use: Not Currently    Drug use: Never     Social Drivers of Health      Received from [facility]    Housing Stability                Physical Exam     ED Triage Vitals [06/26/25 1039]   /51   Pulse 84   Resp 19   Temp 98.6 °F (37 °C)   Temp src Oral   SpO2 98 %   O2 Device None (Room air)       Current:/51   Pulse 84   Temp 98.6 °F (37 °C) (Oral)   Resp 19   LMP 12/28/2024 (Approximate)   SpO2 98%         Physical Exam  Vitals reviewed.   Constitutional:       Appearance: Normal appearance. She is not toxic-appearing.   HENT:      Head: Normocephalic and atraumatic.      Mouth/Throat:      Mouth: Mucous membranes are moist.   Eyes:      Extraocular Movements: Extraocular movements intact.      Conjunctiva/sclera: Conjunctivae normal.      Pupils: Pupils  are equal, round, and reactive to light.   Cardiovascular:      Rate and Rhythm: Normal rate and regular rhythm.   Pulmonary:      Effort: Pulmonary effort is normal.      Breath sounds: Normal breath sounds.   Abdominal:      General: There is no distension.      Palpations: Abdomen is soft.      Tenderness: There is no abdominal tenderness.   Musculoskeletal:         General: No swelling.      Cervical back: Neck supple. No rigidity.      Lumbar back: Tenderness (Mild) present.        Back:       Right lower leg: No edema.      Left lower leg: No edema.      Comments: Pulses intact bilateral lower extremities.   Skin:     General: Skin is warm and dry.      Comments: Scattered patches on bilateral lower extremities her extremities have decreased pigmentation/white areas.  Pulses motor sensation intact throughout.   Neurological:      Mental Status: She is alert and oriented to person, place, and time.      Cranial Nerves: No cranial nerve deficit, dysarthria or facial asymmetry.      Sensory: No sensory deficit (Mild decrease sensation left lateral distal foot).      Motor: No weakness.   Psychiatric:         Mood and Affect: Mood normal.         ED Course     Labs Reviewed   [facility] POCT URINALYSIS DIPSTICK - Abnormal; Notable for the following components:       Result Value    Leukocyte esterase urine Trace (*)     All other components within normal limits   POCT PREGNANCY URINE - Normal   POCT ISTAT CHEM8 CARTRIDGE - Normal   POCT CBC     Imaging:  XR LUMBAR SPINE (MIN 4 VIEWS) (CPT=72110)  Result Date: 6/26/2025  CONCLUSION: Mild iterative change at L5-S1. No acute osseous abnormality of the lumbar spine. Electronically Verified and Signed by Attending Radiologist: Deep Clark MD 6/26/2025 12:18 PM Workstation: [id number]      ED Course as of 06/26/25 1229  ------------------------------------------------------------  Time: 06/26 1120  Comment: CBC and electrolytes are unremarkable.  Pregnancy is negative, urine  shows no infection.  X-rays pending.  Patient reports she needed to leave to take her kids to summer camp.  Will return.  ------------------------------------------------------------  Time: 06/26 1120  Comment: Normal gait.  ------------------------------------------------------------  Time: 06/26 1226  Comment: Patient returned but then left before results were back.  No acute findings.  Patient said she will follow-up with her primary care doctor.          MDM        35 year old female with headache, neurologically intact, chronic back pain with chronic left lateral distal foot numbness and white spots on her skin.  I explained to her that I cannot check vitamin levels here and that would be something that required primary care evaluation.  Will check x-ray of her back and baseline labs and urine.    Differential diagnosis (including but not limited to):  Electrolyte abnormality, chronic pain, sciatica    ED course:  Pulse Ox: 98% on room air which is normal      Comment: Please note this report has been produced using speech recognition software and may contain errors related to that system including errors in grammar, punctuation, and spelling, as well as words and phrases that may be inappropriate. If there are any questions or concerns please feel free to contact the dictating provider for clarification.          Medical Decision Making      Disposition and Plan     Clinical Impression:  1. Mild back pain    2. Acute nonintractable headache, unspecified headache type         Disposition:  Discharge  6/26/2025 12:28 pm    Follow-up:  Mino Edmond, DO  [address]  [address] [place] [address]  [phone]    Schedule an appointment as soon as possible for a visit             Medications Prescribed:  Current Discharge Medication List          Supplementary Documentation:                                                            Landon Armando MD  6/26/2025  11:03 AM

## 2025-06-26 NOTE — ED INITIAL ASSESSMENT (HPI)
Frontal headache, low back pain, white discoloration/rash  to bilateral lower legs for 1 week, no fever, no urinary symptoms, no uri symptoms

## 2025-06-27 ENCOUNTER — HOSPITAL ENCOUNTER (OUTPATIENT)
Age: 35
Discharge: HOME | End: 2025-06-27
Payer: MEDICAID

## 2025-06-27 VITALS
HEART RATE: 74 BPM | SYSTOLIC BLOOD PRESSURE: 107 MMHG | TEMPERATURE: 99 F | RESPIRATION RATE: 18 BRPM | OXYGEN SATURATION: 98 % | DIASTOLIC BLOOD PRESSURE: 53 MMHG

## 2025-06-27 DIAGNOSIS — B37.31 VAGINAL CANDIDIASIS: Primary | ICD-10-CM

## 2025-06-27 LAB
BILIRUB UR QL: NEGATIVE
CLARITY UR: CLEAR
COLOR UR: YELLOW
GLUCOSE UR-MCNC: NORMAL MG/DL
HGB UR QL STRIP.AUTO: NEGATIVE
KETONES UR-MCNC: NEGATIVE MG/DL
LEUKOCYTE ESTERASE UR QL STRIP.AUTO: NEGATIVE
NITRITE UR QL STRIP.AUTO: NEGATIVE
PH UR: 5.5 [PH] (ref 5–8)
PROT UR-MCNC: NEGATIVE MG/DL
SP GR UR STRIP: 1.02 (ref 1–1.03)
UROBILINOGEN UR STRIP-ACNC: NORMAL

## 2025-06-27 PROCEDURE — 81003 URINALYSIS AUTO W/O SCOPE: CPT | Performed by: NURSE PRACTITIONER

## 2025-06-27 PROCEDURE — 87086 URINE CULTURE/COLONY COUNT: CPT | Performed by: NURSE PRACTITIONER

## 2025-06-27 PROCEDURE — 99214 OFFICE O/P EST MOD 30 MIN: CPT

## 2025-06-27 PROCEDURE — 81514 NFCT DS BV&VAGINITIS DNA ALG: CPT | Performed by: NURSE PRACTITIONER

## 2025-06-27 NOTE — ED INITIAL ASSESSMENT (HPI)
Patient was seen here yesterday for headache and back pain. Returns today because \"nobody gave me medicine\". Now reports foul odor to urine and white vaginal discharge. No fever. Continues to have back pain.

## 2025-06-28 LAB
BV BACTERIA DNA VAG QL NAA+PROBE: NEGATIVE
C GLABRATA DNA VAG QL NAA+PROBE: NEGATIVE
C KRUSEI DNA VAG QL NAA+PROBE: POSITIVE
CANDIDA DNA VAG QL NAA+PROBE: POSITIVE
T VAGINALIS DNA VAG QL NAA+PROBE: NEGATIVE

## 2025-06-28 RX ORDER — FLUCONAZOLE 150 MG/1
150 TABLET ORAL DAILY
Qty: 2 TABLET | Refills: 0 | Status: SHIPPED | OUTPATIENT
Start: 2025-06-28 | End: 2025-06-30